# Patient Record
Sex: MALE | Race: WHITE | NOT HISPANIC OR LATINO | Employment: FULL TIME | URBAN - NONMETROPOLITAN AREA
[De-identification: names, ages, dates, MRNs, and addresses within clinical notes are randomized per-mention and may not be internally consistent; named-entity substitution may affect disease eponyms.]

---

## 2018-01-09 ENCOUNTER — OFFICE VISIT (OUTPATIENT)
Dept: FAMILY MEDICINE | Facility: OTHER | Age: 38
End: 2018-01-09
Payer: COMMERCIAL

## 2018-01-09 VITALS
TEMPERATURE: 96.8 F | HEART RATE: 76 BPM | WEIGHT: 207.4 LBS | BODY MASS INDEX: 29.03 KG/M2 | RESPIRATION RATE: 16 BRPM | SYSTOLIC BLOOD PRESSURE: 114 MMHG | HEIGHT: 71 IN | DIASTOLIC BLOOD PRESSURE: 70 MMHG

## 2018-01-09 DIAGNOSIS — R20.2 PARESTHESIA: ICD-10-CM

## 2018-01-09 DIAGNOSIS — M79.642 PAIN IN BOTH HANDS: Primary | ICD-10-CM

## 2018-01-09 DIAGNOSIS — R25.2 MUSCLE CRAMPS: ICD-10-CM

## 2018-01-09 DIAGNOSIS — M79.641 PAIN IN BOTH HANDS: Primary | ICD-10-CM

## 2018-01-09 LAB
ANION GAP SERPL CALCULATED.3IONS-SCNC: 5 MMOL/L (ref 3–14)
BUN SERPL-MCNC: 10 MG/DL (ref 7–30)
CALCIUM SERPL-MCNC: 9.3 MG/DL (ref 8.5–10.1)
CHLORIDE SERPL-SCNC: 105 MMOL/L (ref 94–109)
CO2 SERPL-SCNC: 28 MMOL/L (ref 20–32)
CREAT SERPL-MCNC: 0.9 MG/DL (ref 0.66–1.25)
CRP SERPL-MCNC: <2.9 MG/L (ref 0–8)
ERYTHROCYTE [DISTWIDTH] IN BLOOD BY AUTOMATED COUNT: 12 % (ref 10–15)
GFR SERPL CREATININE-BSD FRML MDRD: >90 ML/MIN/1.7M2
GLUCOSE SERPL-MCNC: 100 MG/DL (ref 70–99)
HCT VFR BLD AUTO: 45.9 % (ref 40–53)
HGB BLD-MCNC: 15.9 G/DL (ref 13.3–17.7)
MCH RBC QN AUTO: 33.1 PG (ref 26.5–33)
MCHC RBC AUTO-ENTMCNC: 34.6 G/DL (ref 31.5–36.5)
MCV RBC AUTO: 95 FL (ref 78–100)
PLATELET # BLD AUTO: 309 10E9/L (ref 150–450)
POTASSIUM SERPL-SCNC: 4.2 MMOL/L (ref 3.4–5.3)
RBC # BLD AUTO: 4.81 10E12/L (ref 4.4–5.9)
SODIUM SERPL-SCNC: 138 MMOL/L (ref 133–144)
VIT B12 SERPL-MCNC: 720 PG/ML (ref 193–986)
WBC # BLD AUTO: 9 10E9/L (ref 4–11)

## 2018-01-09 PROCEDURE — 82607 VITAMIN B-12: CPT | Performed by: PHYSICIAN ASSISTANT

## 2018-01-09 PROCEDURE — 86140 C-REACTIVE PROTEIN: CPT | Performed by: PHYSICIAN ASSISTANT

## 2018-01-09 PROCEDURE — 36415 COLL VENOUS BLD VENIPUNCTURE: CPT | Performed by: PHYSICIAN ASSISTANT

## 2018-01-09 PROCEDURE — 85027 COMPLETE CBC AUTOMATED: CPT | Performed by: PHYSICIAN ASSISTANT

## 2018-01-09 PROCEDURE — 99204 OFFICE O/P NEW MOD 45 MIN: CPT | Performed by: PHYSICIAN ASSISTANT

## 2018-01-09 PROCEDURE — 80048 BASIC METABOLIC PNL TOTAL CA: CPT | Performed by: PHYSICIAN ASSISTANT

## 2018-01-09 RX ORDER — NAPROXEN 500 MG/1
500 TABLET ORAL 2 TIMES DAILY PRN
Qty: 30 TABLET | Refills: 1 | Status: SHIPPED | OUTPATIENT
Start: 2018-01-09

## 2018-01-09 ASSESSMENT — PAIN SCALES - GENERAL: PAINLEVEL: NO PAIN (0)

## 2018-01-09 NOTE — MR AVS SNAPSHOT
After Visit Summary   1/9/2018    Dago Escalante    MRN: [id number]           Patient Information     Date Of Birth          1980        Visit Information        Provider Department      1/9/2018 9:40 AM Mariana Escoto PA-C [facility]        Today's Diagnoses     Pain in both hands    -  1    Paresthesia        Muscle cramps          Care Instructions    I will get some lab work to evaluate your symptoms and will order some testing to further evaluate. I will also refer you to orthopedics for further evaluation and treatment of wrist and hand pain.     Having EMG and NCS Tests  You will be having electromyography (EMG) and nerve conduction studies (NCS) to measure muscle and nerve function. In most cases, both tests are done. NCS is most often done first. You will be asked to lie on an exam table with a blanket over you. You may have one or both of the following:    Nerve conduction study (NCS)  During NCS, mild electrical currents are used to test how fast impulses move along your nerves. The healthcare provider will put small metal disks (electrodes) on your skin on the area of your body being tested. This will be done using water-based gel or paste. A doctor or technologist will apply mild electrical currents to your skin. Your muscles will twitch, but the test won t harm you. Currents are usually applied to the same area several times. Usually the intensity of the electrical stimulation is increased on each body part. Despite some increasing discomfort that varies from person to person, the electrical shock is not dangerous. The test may continue on other parts of your body unless the reason for doing the test is limited to a small part of the body.  Electromyography (EMG)  Most of the electrodes will be removed for EMG. The doctor will clean the area being tested with alcohol. A very fine needle will be put into the muscles in this region. When the needle is inserted, you  may feel as if your skin is being pinched. Try to relax and do as instructed, since you will be asked to relax and contract the muscle being tested. Following instructions will allow your doctor to interpret the test results.  Let the technologist know  For your safety and for the success of your test, tell the technologist if you:    Have any bleeding problems.    Take blood thinners (anticoagulants) or other medications, including aspirin.    Have any immune system problems.    Have had neck or back surgery.  You may also be asked questions about your overall health.  Before the test  Prepare for your test as instructed. Shower or bathe, but don't use powder, oil, or lotion. Your skin should be clean and free of excess oil. Wear loose clothes. But know that you may be asked to change into a hospital gown. The entire test will take about 60 minutes. Be sure to allow extra time to check in.  After your test  Before you leave, all electrodes will be removed. You can then get right back to your normal routine. If you feel tired or have some discomfort, take it easy. If you were told to stop taking any medicines for your test, ask when you can start taking them again. Your doctor will let you know when your test results are ready.  Date Last Reviewed: 9/12/2015 2000-2017 [employer]. [address] [place] [address], [place] [id number]. All rights reserved. This information is not intended as a substitute for professional medical care. Always follow your healthcare professional's instructions.                Follow-ups after your visit        Additional Services     ORTHO  REFERRAL       [facility] is referring you to the Orthopedic  Services at [facility].       The  Representative will assist you in the coordination of your Orthopedic and Musculoskeletal Care as prescribed by your physician.    The  Representative will call you within 1  "business day to help schedule your appointment, or you may contact the  Representative at:    All areas ~ [phone]     Type of Referral : Surgical / Specialist       Timeframe requested: Routine    Coverage of these services is subject to the terms and limitations of your health insurance plan.  Please call member services at your health plan with any benefit or coverage questions.      If X-rays, CT or MRI's have been performed, please contact the facility where they were done to arrange for , prior to your scheduled appointment.  Please bring this referral request to your appointment and present it to your specialist.                  Follow-up notes from your care team     Return if symptoms worsen or fail to improve.      Who to contact     If you have questions or need follow up information about today's clinic visit or your schedule please contact [facility] directly at [phone].  Normal or non-critical lab and imaging results will be communicated to you by dateIITianshart, letter or phone within 4 business days after the clinic has received the results. If you do not hear from us within 7 days, please contact the clinic through MyChart or phone. If you have a critical or abnormal lab result, we will notify you by phone as soon as possible.  Submit refill requests through [employer] or call your pharmacy and they will forward the refill request to us. Please allow 3 business days for your refill to be completed.          Additional Information About Your Visit        [employer] Information     [employer] lets you send messages to your doctor, view your test results, renew your prescriptions, schedule appointments and more. To sign up, go to [email or web address] . Click on \"Log in\" on the left side of the screen, which will take you to the Welcome page. Then click on \"Sign up Now\" on the right side of the page.     You will be asked to enter the access code listed below, as well as " "some personal information. Please follow the directions to create your username and password.     Your access code is: [email or web address]  Expires: 2018 10:05 AM     Your access code will  in 90 days. If you need help or a new code, please call your [place] clinic or [phone].        Care EveryWhere ID     This is your Care EveryWhere ID. This could be used by other organizations to access your [place] medical records  [id number]        Your Vitals Were     Pulse Temperature Respirations Height BMI (Body Mass Index)       76 96.8  F (36  C) (Oral) 16 5' 11.25\" (1.81 m) 28.72 kg/m2        Blood Pressure from Last 3 Encounters:   18 114/70   13 110/76   09 132/76    Weight from Last 3 Encounters:   18 207 lb 6.4 oz (94.1 kg)   13 230 lb 8 oz (104.6 kg)   09 200 lb (90.7 kg)              We Performed the Following     Basic metabolic panel     CBC with platelets     CRP, inflammation     NEEDLE EMG 2 EXTREMITIES     ORTHO  REFERRAL     Vitamin B12          Today's Medication Changes          These changes are accurate as of: 18 10:05 AM.  If you have any questions, ask your nurse or doctor.               Start taking these medicines.        Dose/Directions    naproxen 500 MG tablet   Commonly known as:  NAPROSYN   Used for:  Pain in both hands   Started by:  Mariana Escoto PA-C        Dose:  500 mg   Take 1 tablet (500 mg) by mouth 2 times daily as needed for moderate pain   Quantity:  30 tablet   Refills:  1            Where to get your medicines      These medications were sent to [facility] [place], [place] - [address]   [address] [facility] [address]     Phone:  [phone]     naproxen 500 MG tablet                Primary Care Provider Fax #    Physician No Ref-Primary [phone]       No address on file        Equal Access to Services     CHAZ ASTUDILLO AH: Zoila Siegel, wabeatrizda fe, qaybta gabriel chahal, " hector limelmo wen'aan ah. So [facility] [phone].    ATENCIÓN: Si habla sriram, tiene a melendez disposición servicios gratuitos de asistencia lingüística. Carmen al [phone].    We comply with applicable federal civil rights laws and [place] laws. We do not discriminate on the basis of race, color, national origin, age, disability, sex, sexual orientation, or gender identity.            Thank you!     Thank you for choosing [facility]  for your care. Our goal is always to provide you with excellent care. Hearing back from our patients is one way we can continue to improve our services. Please take a few minutes to complete the written survey that you may receive in the mail after your visit with us. Thank you!             Your Updated Medication List - Protect others around you: Learn how to safely use, store and throw away your medicines at www.disposemymeds.org.          This list is accurate as of: 1/9/18 10:05 AM.  Always use your most recent med list.                   Brand Name Dispense Instructions for use Diagnosis    naproxen 500 MG tablet    NAPROSYN    30 tablet    Take 1 tablet (500 mg) by mouth 2 times daily as needed for moderate pain    Pain in both hands

## 2018-01-09 NOTE — NURSING NOTE
"Chief Complaint   Patient presents with     Pain     bilateral hand and arm pain, x years       Initial /70 (BP Location: Right arm, Patient Position: Chair, Cuff Size: Adult Large)  Pulse 76  Temp 96.8  F (36  C) (Oral)  Resp 16  Ht 5' 11.25\" (1.81 m)  Wt 207 lb 6.4 oz (94.1 kg)  BMI 28.72 kg/m2 Estimated body mass index is 28.72 kg/(m^2) as calculated from the following:    Height as of this encounter: 5' 11.25\" (1.81 m).    Weight as of this encounter: 207 lb 6.4 oz (94.1 kg).  Medication Reconciliation: complete       Kerri HAIDER LPN    "

## 2018-01-09 NOTE — PATIENT INSTRUCTIONS
I will get some lab work to evaluate your symptoms and will order some testing to further evaluate. I will also refer you to orthopedics for further evaluation and treatment of wrist and hand pain.     Having EMG and NCS Tests  You will be having electromyography (EMG) and nerve conduction studies (NCS) to measure muscle and nerve function. In most cases, both tests are done. NCS is most often done first. You will be asked to lie on an exam table with a blanket over you. You may have one or both of the following:    Nerve conduction study (NCS)  During NCS, mild electrical currents are used to test how fast impulses move along your nerves. The healthcare provider will put small metal disks (electrodes) on your skin on the area of your body being tested. This will be done using water-based gel or paste. A doctor or technologist will apply mild electrical currents to your skin. Your muscles will twitch, but the test won t harm you. Currents are usually applied to the same area several times. Usually the intensity of the electrical stimulation is increased on each body part. Despite some increasing discomfort that varies from person to person, the electrical shock is not dangerous. The test may continue on other parts of your body unless the reason for doing the test is limited to a small part of the body.  Electromyography (EMG)  Most of the electrodes will be removed for EMG. The doctor will clean the area being tested with alcohol. A very fine needle will be put into the muscles in this region. When the needle is inserted, you may feel as if your skin is being pinched. Try to relax and do as instructed, since you will be asked to relax and contract the muscle being tested. Following instructions will allow your doctor to interpret the test results.  Let the technologist know  For your safety and for the success of your test, tell the technologist if you:    Have any bleeding problems.    Take blood thinners  (anticoagulants) or other medications, including aspirin.    Have any immune system problems.    Have had neck or back surgery.  You may also be asked questions about your overall health.  Before the test  Prepare for your test as instructed. Shower or bathe, but don't use powder, oil, or lotion. Your skin should be clean and free of excess oil. Wear loose clothes. But know that you may be asked to change into a hospital gown. The entire test will take about 60 minutes. Be sure to allow extra time to check in.  After your test  Before you leave, all electrodes will be removed. You can then get right back to your normal routine. If you feel tired or have some discomfort, take it easy. If you were told to stop taking any medicines for your test, ask when you can start taking them again. Your doctor will let you know when your test results are ready.  Date Last Reviewed: 9/12/2015 2000-2017 [employer]. [address] [place] [address], [place] [address]. All rights reserved. This information is not intended as a substitute for professional medical care. Always follow your healthcare professional's instructions.

## 2018-01-09 NOTE — PROGRESS NOTES
SUBJECTIVE:   Dago Escalante is a 37 year old male who presents to clinic today for the following health issues:      Concern - bilateral hand and arm pain  Onset: years    Description:   Pain and tingling in hands; and bilateral forearm pain    Intensity: moderate, severe    Progression of Symptoms:  same    Accompanying Signs & Symptoms:  nothing    Previous history of similar problem:   yes    Precipitating factors:   Worsened by: use of the hands and arms    Alleviating factors:  Improved by: nothing    Therapies Tried and outcome: nothing    Patient reports that he has had issues with pain in his bilateral hands and wrists with use since he was a teenager. He reports that with any repetitive motion or use he loses  strength and develops some numbness and pain to the hands, symptoms are worse on the right than the left. He has not previously been evaluated for this but does report that he tried using braces for a 2 week period of time and did not have any improvement in symptoms. Patient also reports that at times with use he gets severe cramps in the forearms that actually lock the arm in flexion. He has had some improvement in pain with massage and rest but has not been able to find anything tht helps resolve his symptoms. He denies any other neurologic symptoms, is unaware of any family history of autoimmune or nervous system disease. He has not had fevers, chills or weight changes.   -------------------------------------    Problem list and histories reviewed & adjusted, as indicated.  Additional history: as documented    BP Readings from Last 3 Encounters:   01/09/18 114/70   06/14/13 110/76   08/25/09 132/76    Wt Readings from Last 3 Encounters:   01/09/18 207 lb 6.4 oz (94.1 kg)   06/14/13 230 lb 8 oz (104.6 kg)   08/25/09 200 lb (90.7 kg)         Reviewed and updated as needed this visit by clinical staffTobacco  Allergies  Med Hx  Surg Hx  Fam Hx  Soc Hx      Reviewed and updated as  "needed this visit by Provider       ROS:  Constitutional, HEENT, cardiovascular, pulmonary, gi and gu systems are negative, except as otherwise noted.      OBJECTIVE:   /70 (BP Location: Right arm, Patient Position: Chair, Cuff Size: Adult Large)  Pulse 76  Temp 96.8  F (36  C) (Oral)  Resp 16  Ht 5' 11.25\" (1.81 m)  Wt 207 lb 6.4 oz (94.1 kg)  BMI 28.72 kg/m2  Body mass index is 28.72 kg/(m^2).  GENERAL: healthy, alert and no distress  NECK: no adenopathy  RESP: lungs clear to auscultation - no rales, rhonchi or wheezes  CV: regular rates and rhythm, peripheral pulses strong and no peripheral edema  MS: extremities normal- no gross deformities noted, no tenderness to palpation of the wrists or hands, tinel's and Phalen is negative, full ROM, equal  and no noted swelling  SKIN: no suspicious lesions or rashes    Diagnostic Test Results:  Labs and EMG ordered and pending     ASSESSMENT/PLAN:       ICD-10-CM    1. Pain in both hands M79.641 CRP, inflammation    M79.642 Vitamin B12     CBC with platelets     Basic metabolic panel     NEEDLE EMG 2 EXTREMITIES     ORTHO  REFERRAL     naproxen (NAPROSYN) 500 MG tablet   2. Paresthesia R20.2 CRP, inflammation     Vitamin B12     CBC with platelets     Basic metabolic panel     NEEDLE EMG 2 EXTREMITIES     ORTHO  REFERRAL   3. Muscle cramps R25.2 CRP, inflammation     Vitamin B12     CBC with platelets     Basic metabolic panel     NEEDLE EMG 2 EXTREMITIES     ORTHO  REFERRAL       I discussed symptoms and possible causes with patient. I will get lab work to further evaluate and will also have him get an EMG to further evaluate recurrent longstanding symptoms. I would have him also see orthopedics for further evaluation and treatment. He declines any trial of bracing at this time as he did not have improvement with this in the past. He will take naproxen BID for 10-14 days for inflammation.   See Patient Instructions    Mariana GONZALEZ " HAY Escoto  [facility]

## 2018-01-09 NOTE — LETTER
[facility]  [address] [facility] [address]  [phone]        January 11, 2018    Dago Escalante  [address]  [facility] [address]          Dear Dago,    The results of your recent labs showed as follows:  normal Vitamin B12 levels, normal kidney function, electrolytes, and blood cell counts normal. Follow up with EMG testing as discussed.     If you have any questions or problems, please give us a call at the clinic.    Sincerely,        Mariana Escoto PA-C/MAR to

## 2018-01-16 ENCOUNTER — TRANSFERRED RECORDS (OUTPATIENT)
Dept: HEALTH INFORMATION MANAGEMENT | Facility: CLINIC | Age: 38
End: 2018-01-16

## 2018-01-23 ENCOUNTER — TELEPHONE (OUTPATIENT)
Dept: FAMILY MEDICINE | Facility: OTHER | Age: 38
End: 2018-01-23

## 2018-01-23 NOTE — TELEPHONE ENCOUNTER
Patient called requesting the results from the EMG test that was done on 1/9/2018. I was unable to see any results in his chart for this. Please call patient on his home or cell number.    Rossy Bejarano MA     1/23/2018

## 2018-02-20 NOTE — TELEPHONE ENCOUNTER
Patient called looking for the results, please advise asap.  [phone] home number today  Thank you,  Clair Alicia  Patient Representative

## 2018-02-20 NOTE — TELEPHONE ENCOUNTER
I left a call back message:  Per Mariana Escoto PA-C  EMG normal and if symptoms persist follow up with primary provider or internal med.

## 2018-03-02 ENCOUNTER — OFFICE VISIT (OUTPATIENT)
Dept: FAMILY MEDICINE | Facility: OTHER | Age: 38
End: 2018-03-02
Payer: COMMERCIAL

## 2018-03-02 VITALS
WEIGHT: 209 LBS | HEART RATE: 80 BPM | DIASTOLIC BLOOD PRESSURE: 82 MMHG | BODY MASS INDEX: 28.95 KG/M2 | OXYGEN SATURATION: 97 % | SYSTOLIC BLOOD PRESSURE: 130 MMHG | TEMPERATURE: 96 F

## 2018-03-02 DIAGNOSIS — Z71.6 ENCOUNTER FOR TOBACCO USE CESSATION COUNSELING: ICD-10-CM

## 2018-03-02 DIAGNOSIS — M62.81 HAND MUSCLE WEAKNESS: Primary | ICD-10-CM

## 2018-03-02 DIAGNOSIS — Z13.6 CARDIOVASCULAR SCREENING; LDL GOAL LESS THAN 160: ICD-10-CM

## 2018-03-02 LAB
CHOLEST SERPL-MCNC: 232 MG/DL
HDLC SERPL-MCNC: 39 MG/DL
LDLC SERPL CALC-MCNC: 156 MG/DL
NONHDLC SERPL-MCNC: 193 MG/DL
TRIGL SERPL-MCNC: 184 MG/DL

## 2018-03-02 PROCEDURE — 83516 IMMUNOASSAY NONANTIBODY: CPT | Mod: 59 | Performed by: INTERNAL MEDICINE

## 2018-03-02 PROCEDURE — 99000 SPECIMEN HANDLING OFFICE-LAB: CPT | Performed by: INTERNAL MEDICINE

## 2018-03-02 PROCEDURE — 80061 LIPID PANEL: CPT | Performed by: INTERNAL MEDICINE

## 2018-03-02 PROCEDURE — 99214 OFFICE O/P EST MOD 30 MIN: CPT | Performed by: INTERNAL MEDICINE

## 2018-03-02 PROCEDURE — 83519 RIA NONANTIBODY: CPT | Mod: 90 | Performed by: INTERNAL MEDICINE

## 2018-03-02 PROCEDURE — 36415 COLL VENOUS BLD VENIPUNCTURE: CPT | Performed by: INTERNAL MEDICINE

## 2018-03-02 NOTE — MR AVS SNAPSHOT
"              After Visit Summary   3/2/2018    Dago Escalante    MRN: [id number]           Patient Information     Date Of Birth          1980        Visit Information        Provider Department      3/2/2018 7:00 AM Jeremiah Sears DO [facility]        Today's Diagnoses     Hand muscle weakness    -  1    CARDIOVASCULAR SCREENING; LDL GOAL LESS THAN 160        Encounter for tobacco use cessation counseling           Follow-ups after your visit        Follow-up notes from your care team     Return in about 2 weeks (around 3/16/2018).      Who to contact     If you have questions or need follow up information about today's clinic visit or your schedule please contact [facility] directly at [phone].  Normal or non-critical lab and imaging results will be communicated to you by MyChart, letter or phone within 4 business days after the clinic has received the results. If you do not hear from us within 7 days, please contact the clinic through [employer] or phone. If you have a critical or abnormal lab result, we will notify you by phone as soon as possible.  Submit refill requests through [employer] or call your pharmacy and they will forward the refill request to us. Please allow 3 business days for your refill to be completed.          Additional Information About Your Visit        MyChart Information     [employer] lets you send messages to your doctor, view your test results, renew your prescriptions, schedule appointments and more. To sign up, go to [email or web address] . Click on \"Log in\" on the left side of the screen, which will take you to the Welcome page. Then click on \"Sign up Now\" on the right side of the page.     You will be asked to enter the access code listed below, as well as some personal information. Please follow the directions to create your username and password.     Your access code is: [email or web address]  Expires: 4/9/2018 11:05 AM     Your access code " will  in 90 days. If you need help or a new code, please call your [place] clinic or [phone].        Care EveryWhere ID     This is your Care EveryWhere ID. This could be used by other organizations to access your [place] medical records  [id number]        Your Vitals Were     Pulse Temperature Pulse Oximetry BMI (Body Mass Index)          80 96  F (35.6  C) (Tympanic) 97% 28.95 kg/m2         Blood Pressure from Last 3 Encounters:   18 130/82   18 114/70   13 110/76    Weight from Last 3 Encounters:   18 209 lb (94.8 kg)   18 207 lb 6.4 oz (94.1 kg)   13 230 lb 8 oz (104.6 kg)              We Performed the Following     Acetylcholine receptor blocking rafa     Lipid panel reflex to direct LDL Fasting     MuSK Antibody        Primary Care Provider Fax #    Physician No Ref-Primary [phone]       No address on file        Equal Access to Services     CHAZ ASTUDILLO : Hadii esther ku hadasho Soomaali, waaxda luqadaha, qaybta kaalmada adeegyada, waxay kay campo . So [place] [phone].    ATENCIÓN: Si habla español, tiene a melendez disposición servicios gratuitos de asistencia lingüística. Llame al [phone].    We comply with applicable federal civil rights laws and [place] laws. We do not discriminate on the basis of race, color, national origin, age, disability, sex, sexual orientation, or gender identity.            Thank you!     Thank you for choosing [facility]  for your care. Our goal is always to provide you with excellent care. Hearing back from our patients is one way we can continue to improve our services. Please take a few minutes to complete the written survey that you may receive in the mail after your visit with us. Thank you!             Your Updated Medication List - Protect others around you: Learn how to safely use, store and throw away your medicines at www.disposemymeds.org.          This list is accurate as of  3/2/18 11:59 PM.  Always use your most recent med list.                   Brand Name Dispense Instructions for use Diagnosis    naproxen 500 MG tablet    NAPROSYN    30 tablet    Take 1 tablet (500 mg) by mouth 2 times daily as needed for moderate pain    Pain in both hands

## 2018-03-02 NOTE — PROGRESS NOTES
"  SUBJECTIVE:   Dago Escalante is a 37 year old male who presents to clinic today for the following health issues:    New Patient/Transfer of Care          Chief Complaint:  Dago Escalante is an otherwise healthy 37 year old male who presents today to establish care and concerns of a multi-year history of bilateral hand weakness and numbness. According to the patient, over the course of the past 15 years, he has been experiencing weakness and paraesthesias in his hands/distal upper extremities that seem to be exacerbated with any use of his upper extremities (especially his hands) including writing, shoveling and anything that involves  strength. 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 He states he would be doing something with his upper extremities, like shoveling, and would get a burning sensation in his forearms, he would flex his elbow and they would become \"locked\" requiring him manually extend the elbow with his other arm or have someone pull on it to get it back into extension. He also complains of difficulty with tasks such as writing or fine motor movements such as playing with Legos with his son. He states he can write for only a very short time before he develops weakness and paresthesias in his hands making it difficult to continue writing or performing fine motor movements. He was seen in clinic on 01/09 for these complaints and underwent an unremarkable EMG, as well normal laboratory evaluation including BMP, CBC, vitamin B12 and CRP. At that time, his symptoms were thought to possibly be related to carpal tunnel and he was started on Naproxen and advised to follow up with orthopedics. Today, the patient continues to complain of similar symptoms. Most recently, he reports being at a car wash using a  and after a few minutes of gripping the , he developed severe weakness to the point he was unable to put " his hand into his pocket or even  a quarter out of his other hand. He has not had any lower extremity weakness of paresthesias. He denies any shoulder/neck pain or weakness. He has not had any other focal neurological deficits including any vision changes or headaches.      The patient also would like information on quitting smoking. Currently, he smokes approximately 0.5 packs per day. He has tried to stop multiple times in the past with multiple different therapies including Nicotine patches, Nicotine gum, Chantix and hypnosis, all without any success.  He denies any other concerns or complaints at this time. He has not had any chest pain or shortness of breath. Appetite has been good with no nausea or vomiting. Bowel and bladder habits have been at baseline.     Allergies:  Allergies   Allergen Reactions     No Known Drug Allergies        Medical History:   No past medical history on file.    Current Medications:   Current Outpatient Prescriptions   Medication Sig Dispense Refill     naproxen (NAPROSYN) 500 MG tablet Take 1 tablet (500 mg) by mouth 2 times daily as needed for moderate pain 30 tablet 1       Surgical History:  Past Surgical History:   Procedure Laterality Date     TONSILLECTOMY  1993       Social/Family History:  Social History   Substance Use Topics     Smoking status: Current Every Day Smoker     Packs/day: 0.50     Years: 7.00     Types: Cigarettes     Smokeless tobacco: Never Used     Alcohol use Yes     No family history on file.    ROS:   Constitutional: The patient denies significant weight gain, weight loss.   SKIN: Pt denies: itching, rashes, discoloration, or specific lesions of concern.    EYES: Pt denies: double vision, blurred vision.    HEART: Pt denies: chest pain, arrythmia, syncope or excess edema.   LUNGS: Pt denies: cough or shortness of breath.   GI: Pt denies: nausea, vomiting, diarrhea.   MS: Pt denies: other significant joint pain, swelling, or acute loss of function  besides the distal upper extremities. Able to ambulate with little or no assistance.   NEURO: Weakness and paresthesias to bilateral upper extremities. No weakness, paraesthesias, or paralysis to the lower extremities.       Physical Exam:  /82 (BP Location: Left arm, Patient Position: Chair, Cuff Size: Adult Regular)  Pulse 80  Temp 96  F (35.6  C) (Tympanic)  Wt 209 lb (94.8 kg)  SpO2 97%  BMI 28.95 kg/m2     Constitutional: The patient appears to be in no acute distress. The patient appears to be adequately hydrated. No acute respiratory or hemodynamic distress is noted at this time.   EYES: Pupils are equal, round and reactive. No significant ptosis, conjunctivitis, or inflammation. Vision is grossly intact bilaterally.   LUNGS: clear bilaterally, airflow is brisk, no intercostal retraction or stridor is noted. No coughing is noted during visit.   HEART:  regular without rubs, clicks, gallops, or murmurs. Upstrokes are brisk. S1,S2 are heard.   GI: Abdomen is soft, without rebound, guarding or tenderness.   MS: No deformity of upper extremities is present. No acute joint erythema or swelling.   NEURO: Initially  strength in the upper bilateral hands were slightly diminished bilaterally. Patient then copied a sentence and by the 4-5th line his hand writing was deteriorating and he was having more difficult writing the sentences. Following this  strength in the right hand was noticeably decreased. Muscle strength lower extremities is equal and symmetrical bilaterally. Pt is alert and appropriate. No neurologic lateralization is noted. Cranial nerves 2-12 are grossly intact. Peripheral sensory and motor function are grossly normal.       Decision Makin. Hand muscle weakness  Given the description of the patients symptoms and the recent negative EMG and lab work, we will go ahead and test for myasthenia gravis. I discussed this in detail with the patient and he was agreeable to this. We will  contact the patient with the lab results once we get them back and discuss further treatment options at that time.   - Acetylcholine receptor blocking rafa  - MuSK Antibody    2. CARDIOVASCULAR SCREENING; LDL GOAL LESS THAN 160  Patient has not had a lipid panel recently, we will check these today.   - Lipid panel reflex to direct LDL Fasting    3. Encounter for tobacco use cessation counseling  Discussed at length with the patient different options for smoking cessation and since he has tried medication and nicotine patches in the past without any success, I encouraged him to deploy distraction techniques and explained these to him. Patient voiced his understanding of this. On his follow up appointment in the next 1-2 weeks we will discuss how this approach is working and determine other options if needed.     Diagnosis:   Encounter Diagnoses   Name Primary?     Hand muscle weakness Yes     CARDIOVASCULAR SCREENING; LDL GOAL LESS THAN 160        Follow up:  I have asked the patient to schedule a follow up appointment with me in 1-2 weeks to go over the lab results, or sooner if conditions worsen.      I have carefully explained the diagnosis and treatment options with the patient. The patient has displayed an understanding of the above, and all subsequent questions were answered.          This patient has been interviewed, examined, diagnosed, and informed of the above by me personally.  Medical records and available pertinent information has been reviewed by me personally.  All decisions and discussion have been between myself and the patient/family.  This was done in the presence of Zev marinelli , who acted as a medical scribe and recorded the events above.  No diagnosis or decision making was made by the above-mentioned scribe.  The patient, and or his/her ensurors will not be billed for the presence or actions of this scribe.  The information recorded by the scribe has been reviewed by me and  found to be accurate.            DO ELIZABETH Aceves    Portions of this note were produced using [employer]  Although every attempt at real-time proof reading has been made, occasional grammar/syntax errors may have been missed.

## 2018-03-02 NOTE — NURSING NOTE
"Chief Complaint   Patient presents with     Establish Care     Emg       Initial /82 (BP Location: Left arm, Patient Position: Chair, Cuff Size: Adult Regular)  Pulse 80  Temp 96  F (35.6  C) (Tympanic)  Wt 209 lb (94.8 kg)  SpO2 97%  BMI 28.95 kg/m2 Estimated body mass index is 28.95 kg/(m^2) as calculated from the following:    Height as of 1/9/18: 5' 11.25\" (1.81 m).    Weight as of this encounter: 209 lb (94.8 kg).  Medication Reconciliation: complete  Bernice HAMILTON    "

## 2018-03-02 NOTE — LETTER
March 15, 2018      Dgao Escalante  [address]  [place] [address]        Dear ,    We are writing to inform you of your test results.    The antibody testing for myasthenia gravis appears to be negative. I recommend follow-up with neurology.     Resulted Orders   Lipid panel reflex to direct LDL Fasting   Result Value Ref Range    Cholesterol 232 (H) <200 mg/dL      Comment:      Desirable:       <200 mg/dl    Triglycerides 184 (H) <150 mg/dL      Comment:      Borderline high:  150-199 mg/dl  High:             200-499 mg/dl  Very high:       >499 mg/dl  Fasting specimen      HDL Cholesterol 39 (L) >39 mg/dL    LDL Cholesterol Calculated 156 (H) <100 mg/dL      Comment:      Above desirable:  100-129 mg/dl  Borderline High:  130-159 mg/dL  High:             160-189 mg/dL  Very high:       >189 mg/dl      Non HDL Cholesterol 193 (H) <130 mg/dL      Comment:      Above Desirable:  130-159 mg/dl  Borderline high:  160-189 mg/dl  High:             190-219 mg/dl  Very high:       >219 mg/dl     Acetylcholine receptor blocking rafa   Result Value Ref Range    AcetChol Block Rafa 12 0 - 26 %      Comment:      (Note)  INTERPRETIVE INFORMATION: Acetylcholine Blocking Ab   Negative ............  0-26 percent blocking   Indeterminate ....... 27-41 percent blocking   Positive ............ 42 percent or greater blocking  Approximately 85-90 percent of patients with myasthenia   gravis (MG) express antibodies to the acetylcholine   receptor (AChR), which can be divided into binding,   blocking, and modulating antibodies. Binding antibody can   activate complement and lead to loss of AChR. Blocking   antibody may impair binding of acetylcholine to the   receptor, leading to poor muscle contraction. Modulating   antibody causes receptor endocytosis resulting in loss of   AChR expression, which correlates most closely with   clinical severity of disease. Approximately 10-15 percent   of individuals with confirmed  myasthenia gravis have no   measurable binding, blocking, or modulating antibodies.  Test developed and characteristics determined by [facility]. See Compliance Statement B: [employer]/  Performed by [facility],  [address] [place] [address] [phone]  [email or web address], Marlo Boggs MD, Lab. Director     MuSK Antibody   Result Value Ref Range    MuSK Antibody 0.00 0.00 - 0.02 nmol/L      Comment:      (Note)  A negative result does not exclude the diagnosis of  autoimmune myasthenia gravis. Testing for acetylcholine  receptor binding and modulating antibodies, and striational  antibody should be considered.  -------------------ADDITIONAL INFORMATION-------------------  This test was developed using an analyte specific reagent.  Its performance characteristics were determined by [facility] in a manner consistent with CLIA requirements. This  test has not been cleared or approved by the U.S. Food and  Drug Administration.  Performed at: [facility], [address] [place] [address]         If you have any questions or concerns, please call the clinic at the number listed above.       Sincerely,        Jeremiah Sears, DO

## 2018-03-05 LAB — ACHR BLOCK AB/ACHR TOTAL SFR SER: 12 % (ref 0–26)

## 2018-03-08 LAB — MUSK AB SER-SCNC: 0 NMOL/L (ref 0–0.02)

## 2018-03-13 NOTE — PROGRESS NOTES
Please contact the patient and notify him of the following:    The antibody testing for myasthenia gravis appears to be negative.  Would recommend follow-up with neurology.      Thank you.   DO ELIZABETH Aceves

## 2018-03-22 ENCOUNTER — TELEPHONE (OUTPATIENT)
Dept: FAMILY MEDICINE | Facility: OTHER | Age: 38
End: 2018-03-22

## 2018-03-22 DIAGNOSIS — R29.898 HAND WEAKNESS: Primary | ICD-10-CM

## 2018-03-22 NOTE — TELEPHONE ENCOUNTER
Reason for Call:  Other call back    Detailed comments: Pt wants a call back from Renu explaining his results to him. Please call and advise.     Phone Number Patient can be reached at: Home number on file [phone] (home) or [phone]    Best Time: anytime    Can we leave a detailed message on this number? YES    Call taken on 3/22/2018 at 7:18 AM by Cleopatra Gayle

## 2018-03-22 NOTE — TELEPHONE ENCOUNTER
I have attempted to contact this patient by phone with the following results: left message to return my call on answering machine.  Rossy Bejarano MA     3/22/2018

## 2018-03-23 NOTE — TELEPHONE ENCOUNTER
Patient is willing to see neurology, also encouraged to make an appointment for a yearly physical.     Referral for neurology placed will need Dx and Signature and patient will be contacted by scheduling.    America Rey XRO/  [facility]

## 2018-04-06 ENCOUNTER — TRANSFERRED RECORDS (OUTPATIENT)
Dept: HEALTH INFORMATION MANAGEMENT | Facility: CLINIC | Age: 38
End: 2018-04-06

## 2018-07-16 ENCOUNTER — TRANSFERRED RECORDS (OUTPATIENT)
Dept: HEALTH INFORMATION MANAGEMENT | Facility: CLINIC | Age: 38
End: 2018-07-16

## 2023-05-11 ENCOUNTER — TELEPHONE (OUTPATIENT)
Dept: FAMILY MEDICINE | Facility: CLINIC | Age: 43
End: 2023-05-11

## 2023-05-11 ENCOUNTER — OFFICE VISIT (OUTPATIENT)
Dept: FAMILY MEDICINE | Facility: CLINIC | Age: 43
End: 2023-05-11
Payer: COMMERCIAL

## 2023-05-11 VITALS
WEIGHT: 187.8 LBS | RESPIRATION RATE: 16 BRPM | OXYGEN SATURATION: 99 % | DIASTOLIC BLOOD PRESSURE: 78 MMHG | BODY MASS INDEX: 26.29 KG/M2 | TEMPERATURE: 97.3 F | HEIGHT: 71 IN | SYSTOLIC BLOOD PRESSURE: 120 MMHG | HEART RATE: 72 BPM

## 2023-05-11 DIAGNOSIS — Z72.0 TOBACCO ABUSE: ICD-10-CM

## 2023-05-11 DIAGNOSIS — G24.9 DYSTONIA: ICD-10-CM

## 2023-05-11 DIAGNOSIS — Z76.89 ENCOUNTER TO ESTABLISH CARE: Primary | ICD-10-CM

## 2023-05-11 DIAGNOSIS — K59.00 CONSTIPATION, UNSPECIFIED CONSTIPATION TYPE: ICD-10-CM

## 2023-05-11 DIAGNOSIS — H61.23 IMPACTED CERUMEN OF BOTH EARS: ICD-10-CM

## 2023-05-11 DIAGNOSIS — H93.13 TINNITUS, BILATERAL: ICD-10-CM

## 2023-05-11 DIAGNOSIS — F17.200 NICOTINE DEPENDENCE, UNCOMPLICATED, UNSPECIFIED NICOTINE PRODUCT TYPE: ICD-10-CM

## 2023-05-11 PROCEDURE — 99204 OFFICE O/P NEW MOD 45 MIN: CPT | Performed by: STUDENT IN AN ORGANIZED HEALTH CARE EDUCATION/TRAINING PROGRAM

## 2023-05-11 PROCEDURE — 99406 BEHAV CHNG SMOKING 3-10 MIN: CPT | Performed by: STUDENT IN AN ORGANIZED HEALTH CARE EDUCATION/TRAINING PROGRAM

## 2023-05-11 RX ORDER — BUPROPION HYDROCHLORIDE 150 MG/1
TABLET, FILM COATED, EXTENDED RELEASE ORAL
Qty: 57 TABLET | Refills: 0 | Status: SHIPPED | OUTPATIENT
Start: 2023-05-11 | End: 2023-06-10

## 2023-05-11 RX ORDER — BUPROPION HYDROCHLORIDE 150 MG/1
150 TABLET, FILM COATED, EXTENDED RELEASE ORAL 2 TIMES DAILY
Qty: 60 TABLET | Refills: 2 | Status: SHIPPED | OUTPATIENT
Start: 2023-06-10

## 2023-05-11 ASSESSMENT — PATIENT HEALTH QUESTIONNAIRE - PHQ9
SUM OF ALL RESPONSES TO PHQ QUESTIONS 1-9: 3
10. IF YOU CHECKED OFF ANY PROBLEMS, HOW DIFFICULT HAVE THESE PROBLEMS MADE IT FOR YOU TO DO YOUR WORK, TAKE CARE OF THINGS AT HOME, OR GET ALONG WITH OTHER PEOPLE: NOT DIFFICULT AT ALL
SUM OF ALL RESPONSES TO PHQ QUESTIONS 1-9: 3

## 2023-05-11 ASSESSMENT — PAIN SCALES - GENERAL: PAINLEVEL: NO PAIN (0)

## 2023-05-11 NOTE — PATIENT INSTRUCTIONS
Zyban (burpropion) Oral Tablet    Uses  This medicine is used for the following purposes:      depression    stop smoking    Instructions  Swallow the medicine without crushing or chewing it.    This medicine may be taken with or without food.    Try to avoid taking the medicine at bedtime.    Keep the medicine at room temperature. Avoid heat and direct light.    Choose a date to quit smoking. Start this medicine 1 week before your chosen day to quit smoking.    If you forget to take a dose on time, take it as soon as you remember. If it is almost time for the next dose, do not take the missed dose. Return to your normal dosing schedule. Do not take 2 doses of this medicine at one time.    Please tell your doctor and pharmacist about all the medicines you take. Include both prescription and over-the-counter medicines. Also tell them about any vitamins, herbal medicines, or anything else you take for your health.    Do not suddenly stop taking this medicine. Check with your doctor before stopping.    Cautions  Tell your doctor and pharmacist if you ever had an allergic reaction to a medicine. Symptoms of an allergic reaction can include trouble breathing, skin rash, itching, swelling, or severe dizziness.    This medicine is associated with an increased risk for seizures. Please ask your doctor whether you may be at risk for having a seizure while on this medicine.    Do not use the medication any more than instructed.    Your ability to stay alert or to react quickly may be impaired by this medicine. Do not drive or operate machinery until you know how this medicine will affect you.    Please check with your doctor before drinking alcohol while on this medicine.    Family should check on the patient often. Call the doctor if patient becomes more depressed, has thoughts of suicide, or shows changes in behavior.    Call the doctor if there are any signs of confusion or unusual changes in behavior.    Tell the  doctor or pharmacist if you are pregnant, planning to be pregnant, or breastfeeding.    Ask your pharmacist if this medicine can interact with any of your other medicines. Be sure to tell them about all the medicines you take.    Do not start or stop any other medicines without first speaking to your doctor or pharmacist.    Do not share this medicine with anyone who has not been prescribed this medicine.    This medicine is associated with increased risk of death in certain patients. Please speak with your doctor about the benefits and risks of using this medicine.    This medicine can cause serious side effects in some patients. Important information from the U.S. Food and Drug Administration (FDA) is available from your pharmacist. Please review it carefully with your pharmacist to understand the risks associated with this medicine.    Side Effects  The following is a list of some common side effects from this medicine. Please speak with your doctor about what you should do if you experience these or other side effects.      agitated feeling or trouble sleeping    decreased appetite    increased appetite    constipation    dizziness    drowsiness or sedation    dry mouth    headaches    high blood pressure    itching    muscle pain    nausea    skin irritation such as redness, itching, rash, or burning    problems with sexual functions or desire    sore throat    stomach upset or abdominal pain    sweating    vomiting    weight loss    If you have any of the following side effects, you may be getting too much medicine. Please contact your doctor to let them know about these side effects.      change in behavior    confusion    diarrhea    fainting    hallucinations (unusual thoughts, seeing or hearing things that are not real)    rapid heartbeat    pain in the joints    tight or rigid muscles    muscle trembling    Call your doctor or get medical help right away if you notice any of these more serious side  effects:      chest pain    fast or irregular heart beats    dilation of the pupils    seizures    shortness of breath    A few people may have an allergic reaction to this medicine. Symptoms can include difficulty breathing, skin rash, itching, swelling, or severe dizziness. If you notice any of these symptoms, seek medical help quickly.    Extra  Please speak with your doctor, nurse, or pharmacist if you have any questions about this medicine.      [email or web address]    IMPORTANT NOTE: This document tells you briefly how to take your medicine, but it does not tell you all there is to know about it. Your doctor or pharmacist may give you other documents about your medicine. Please talk to them if you have any questions. Always follow their advice. There is a more complete description of this medicine available in English. Scan this code on your smartphone or tablet or use the web address below. You can also ask your pharmacist for a printout. If you have any questions, please ask your pharmacist.   2021 [employer]      7045-1048 [employer]. All rights reserved. This information is not intended as a substitute for professional medical care. Always follow your healthcare professional's instructions.

## 2023-05-11 NOTE — PROGRESS NOTES
"  Assessment & Plan     Encounter to establish care  Discussed obtain labs with lipids and glucose screening today but not wanting to at this time.    Tinnitus, bilateral  Chronic in the setting of significant noise exposure.  Suspect some hearing loss.  Normal TMs today.  Discussed follow-up with audiology but he would like to hold off for now.    Tobacco abuse  Nicotine dependence, uncomplicated, unspecified nicotine product type  - [place] Quit Partner Referral  - buPROPion (ZYBAN) 150 MG 12 hr tablet  Dispense: 57 tablet; Refill: 0  - buPROPion (ZYBAN) 150 MG 12 hr tablet  Dispense: 60 tablet; Refill: 2  - nicotine (NICOTROL) 10 MG inhaler  Dispense: 6 each; Refill: 2  - SMOKING CESSATION COUNSELING 3-10 MIN    Constipation, unspecified constipation type  Encourage increase fluid and fiber intake.  Can use MiraLAX as needed    Dystonia  Symptoms unchanged and is managed well in his day-to-day.  Significant upper extremity exertion does cause what sounds like dystonia.  Previous evaluation done and did not tolerate medication by neurology including carbamazepine.  Did not find PT significantly helpful.  He has no other focal deficits or radicular symptoms that would be concerning for cervical compression.  I did recommend follow-up with neuromuscular specialist which she is willing to do now.  - Adult Neurology  Referral    Impacted cerumen of both ears  - [place] REMOVAL IMPACTED CERUMEN IRRIGATION/LVG ABIOLA (RN/MA)       Nicotine/Tobacco Cessation:  He reports that he has been smoking cigarettes. He has a 23.00 pack-year smoking history. He has never used smokeless tobacco.  Nicotine/Tobacco Cessation Plan:   Pharmacotherapies : bupropion (Zyban) and Nicotine inhaler      BMI:   Estimated body mass index is 26.01 kg/m  as calculated from the following:    Height as of this encounter: 1.81 m (5' 11.25\").    Weight as of this encounter: 85.2 kg (187 lb 12.8 oz).   Weight management plan: Discussed healthy diet " "and exercise guidelines    Lenny Dumont MD  [facility] CHINTAN Loza is a 42 year old, presenting for the following health issues:  Establish Care and Ear Problem (Ringing in bilateral ears/)        5/11/2023     8:08 AM   Additional Questions   Roomed by Kerri HAIDER LPN         5/11/2023     8:08 AM   Patient Reported Additional Medications   Patient reports taking the following new medications stool softner     History of Present Illness       Reason for visit:  Ear ringing  Symptom onset:  More than a month  Symptom intensity:  Severe  Symptom progression:  Staying the same  Had these symptoms before:  Yes  Has tried/received treatment for these symptoms:  No    He eats 2-3 servings of fruits and vegetables daily.He consumes 1 sweetened beverage(s) daily.He exercises with enough effort to increase his heart rate 10 to 19 minutes per day.  He exercises with enough effort to increase his heart rate 4 days per week.   He is taking medications regularly.    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 Shoots a lot of guns and around a lot of drag racing also with significant exposure. Does ride motorcycle as well.       Review of Systems   Constitutional, HEENT, cardiovascular, pulmonary, gi and gu systems are negative, except as otherwise noted.      Objective    /78 (BP Location: Right arm, Patient Position: Chair)   Pulse 72   Temp 97.3  F (36.3  C) (Temporal)   Resp 16   Ht 1.81 m (5' 11.25\")   Wt 85.2 kg (187 lb 12.8 oz)   SpO2 99%   BMI 26.01 kg/m    Body mass index is 26.01 kg/m .  Physical Exam   GENERAL: healthy, alert and no distress  EYES: Eyes grossly normal to " inspection, PERRL and conjunctivae and sclerae normal  HENT: ear canals obstructed initially but normal after cleaning and TM's normal, nose and mouth without ulcers or lesions  NECK: no adenopathy, no asymmetry, masses, or scars and thyroid normal to palpation  RESP: lungs clear to auscultation - no rales, rhonchi or wheezes  CV: regular rate and rhythm, normal S1 S2, no S3 or S4, no murmur, click or rub, no peripheral edema and peripheral pulses strong  ABDOMEN: soft, nontender, no hepatosplenomegaly, no masses and bowel sounds normal  MS: no gross musculoskeletal defects noted, no edema  SKIN: no suspicious lesions or rashes  NEURO: Normal strength and tone, normal upper extremity strength and symmetric, mentation intact and speech normal  PSYCH: mentation appears normal, affect normal/bright

## 2023-05-11 NOTE — TELEPHONE ENCOUNTER
Prior Authorization Retail Medication Request    Medication/Dose: buPROPion (ZYBAN) 150 MG 12 hr tablet  ICD code (if different than what is on RX):  Nicotine dependence, uncomplicated, unspecified nicotine product type [F17.200]   Previously Tried and Failed:  na  Rationale:  na    Insurance Name:  [facility]  Insurance ID:  [id number]      Pharmacy Information (if different than what is on RX)  Name:  [facility]  Phone:  [phone]

## 2023-05-11 NOTE — NURSING NOTE
Patient identified using two patient identifiers.  Ear exam showing wax occlusion completed by provider.  Solution: warm water was placed in the bilateral ear(s) via irrigation tool: elephant ear.      Leatha Fink CMA

## 2023-05-17 NOTE — TELEPHONE ENCOUNTER
PA Initiation    Medication: BUPROPION HCL ER (SMOKING DET) 150 MG PO TB12  Insurance Company: [employer] ([facility]) - Phone [phone] Fax [phone]  Pharmacy Filling the Rx: [id number] - [place] - [address] [place] [address]  Filling Pharmacy Phone: [phone]  Filling Pharmacy Fax: [phone]  Start Date: 5/17/2023

## 2023-05-18 NOTE — TELEPHONE ENCOUNTER
Prior Authorization Not Needed per Insurance    Medication: BUPROPION HCL ER (SMOKING DET) 150 MG PO TB12  Insurance Company: [employer] ([facility]) - Phone [phone] Fax [phone]  Expected CoPay:      Pharmacy Filling the Rx: [id number] - [place], [place] - [address] [place] [address]  Pharmacy Notified: No  Patient Notified: No    Patient needs to call BC Quit  Program at [phone]. Clinic staff please notify patient.

## 2023-05-21 ENCOUNTER — HEALTH MAINTENANCE LETTER (OUTPATIENT)
Age: 43
End: 2023-05-21

## 2023-06-12 ENCOUNTER — TELEPHONE (OUTPATIENT)
Dept: FAMILY MEDICINE | Facility: CLINIC | Age: 43
End: 2023-06-12
Payer: COMMERCIAL

## 2023-06-12 NOTE — TELEPHONE ENCOUNTER
Prior Authorization Retail Medication Request    Medication/Dose: nicotine (NICOTROL) 10 MG inhaler  ICD code (if different than what is on RX):  Nicotine dependence, uncomplicated, unspecified nicotine product type [F17.200]   Previously Tried and Failed:  na  Rationale:  na    Insurance Name:  [facility]  Insurance ID:  [id number]      Pharmacy Information (if different than what is on RX)  Name:  [facility]  Phone:  [phone]

## 2023-06-16 NOTE — TELEPHONE ENCOUNTER
PRIOR AUTHORIZATION DENIED    Medication: NICOTROL 10 MG IN INHA  Insurance Company: [employer] ([facility]) - Phone [phone] Fax [phone]  Denial Date: 6/16/2023  Denial Rational: Patient needs to call BC Quit  Program [phone]        Appeal Information: N/A  Patient Notified: No

## 2023-06-16 NOTE — TELEPHONE ENCOUNTER
Beny Ware 9 minutes ago (10:40 AM)     BK  Hello,   This medication has been denied.   Please see documentation for denial rationale.   Please notify patient and you can close the encounter.     Thank you,   Linn Guevara Specialist

## 2023-06-20 NOTE — TELEPHONE ENCOUNTER
Left message, gave patient number for Quit  Program - needs to enroll in this.    America Rey XRO/

## 2024-05-25 ENCOUNTER — HEALTH MAINTENANCE LETTER (OUTPATIENT)
Age: 44
End: 2024-05-25

## 2024-12-18 ENCOUNTER — OFFICE VISIT (OUTPATIENT)
Dept: FAMILY MEDICINE | Facility: CLINIC | Age: 44
End: 2024-12-18
Payer: COMMERCIAL

## 2024-12-18 VITALS
SYSTOLIC BLOOD PRESSURE: 118 MMHG | HEART RATE: 75 BPM | HEIGHT: 72 IN | WEIGHT: 183.8 LBS | TEMPERATURE: 98.3 F | RESPIRATION RATE: 18 BRPM | OXYGEN SATURATION: 99 % | BODY MASS INDEX: 24.89 KG/M2 | DIASTOLIC BLOOD PRESSURE: 66 MMHG

## 2024-12-18 DIAGNOSIS — M54.2 NECK PAIN: ICD-10-CM

## 2024-12-18 DIAGNOSIS — Z00.00 ROUTINE GENERAL MEDICAL EXAMINATION AT A HEALTH CARE FACILITY: Primary | ICD-10-CM

## 2024-12-18 DIAGNOSIS — F17.200 NICOTINE DEPENDENCE, UNCOMPLICATED, UNSPECIFIED NICOTINE PRODUCT TYPE: ICD-10-CM

## 2024-12-18 DIAGNOSIS — M72.2 PLANTAR FASCIITIS: ICD-10-CM

## 2024-12-18 DIAGNOSIS — E78.2 MIXED HYPERLIPIDEMIA: ICD-10-CM

## 2024-12-18 DIAGNOSIS — H93.13 TINNITUS, BILATERAL: ICD-10-CM

## 2024-12-18 DIAGNOSIS — K59.00 CONSTIPATION, UNSPECIFIED CONSTIPATION TYPE: ICD-10-CM

## 2024-12-18 DIAGNOSIS — G24.9 DYSTONIA: ICD-10-CM

## 2024-12-18 LAB
ALBUMIN SERPL BCG-MCNC: 4.7 G/DL (ref 3.5–5.2)
ALP SERPL-CCNC: 73 U/L (ref 40–150)
ALT SERPL W P-5'-P-CCNC: 21 U/L (ref 0–70)
ANION GAP SERPL CALCULATED.3IONS-SCNC: 11 MMOL/L (ref 7–15)
AST SERPL W P-5'-P-CCNC: 18 U/L (ref 0–45)
BILIRUB SERPL-MCNC: 0.5 MG/DL
BUN SERPL-MCNC: 12 MG/DL (ref 6–20)
CALCIUM SERPL-MCNC: 9.7 MG/DL (ref 8.8–10.4)
CHLORIDE SERPL-SCNC: 102 MMOL/L (ref 98–107)
CHOLEST SERPL-MCNC: 233 MG/DL
CREAT SERPL-MCNC: 0.97 MG/DL (ref 0.67–1.17)
EGFRCR SERPLBLD CKD-EPI 2021: >90 ML/MIN/1.73M2
FASTING STATUS PATIENT QL REPORTED: NO
FASTING STATUS PATIENT QL REPORTED: NO
GLUCOSE SERPL-MCNC: 101 MG/DL (ref 70–99)
HCO3 SERPL-SCNC: 25 MMOL/L (ref 22–29)
HDLC SERPL-MCNC: 47 MG/DL
LDLC SERPL CALC-MCNC: 172 MG/DL
NONHDLC SERPL-MCNC: 186 MG/DL
POTASSIUM SERPL-SCNC: 4.3 MMOL/L (ref 3.4–5.3)
PROT SERPL-MCNC: 7 G/DL (ref 6.4–8.3)
SODIUM SERPL-SCNC: 138 MMOL/L (ref 135–145)
TRIGL SERPL-MCNC: 70 MG/DL

## 2024-12-18 PROCEDURE — 80053 COMPREHEN METABOLIC PANEL: CPT | Performed by: STUDENT IN AN ORGANIZED HEALTH CARE EDUCATION/TRAINING PROGRAM

## 2024-12-18 PROCEDURE — 99396 PREV VISIT EST AGE 40-64: CPT | Mod: 25 | Performed by: STUDENT IN AN ORGANIZED HEALTH CARE EDUCATION/TRAINING PROGRAM

## 2024-12-18 PROCEDURE — 90471 IMMUNIZATION ADMIN: CPT | Performed by: STUDENT IN AN ORGANIZED HEALTH CARE EDUCATION/TRAINING PROGRAM

## 2024-12-18 PROCEDURE — 90715 TDAP VACCINE 7 YRS/> IM: CPT | Performed by: STUDENT IN AN ORGANIZED HEALTH CARE EDUCATION/TRAINING PROGRAM

## 2024-12-18 PROCEDURE — 80061 LIPID PANEL: CPT | Performed by: STUDENT IN AN ORGANIZED HEALTH CARE EDUCATION/TRAINING PROGRAM

## 2024-12-18 PROCEDURE — 36415 COLL VENOUS BLD VENIPUNCTURE: CPT | Performed by: STUDENT IN AN ORGANIZED HEALTH CARE EDUCATION/TRAINING PROGRAM

## 2024-12-18 SDOH — HEALTH STABILITY: PHYSICAL HEALTH: ON AVERAGE, HOW MANY MINUTES DO YOU ENGAGE IN EXERCISE AT THIS LEVEL?: 20 MIN

## 2024-12-18 SDOH — HEALTH STABILITY: PHYSICAL HEALTH: ON AVERAGE, HOW MANY DAYS PER WEEK DO YOU ENGAGE IN MODERATE TO STRENUOUS EXERCISE (LIKE A BRISK WALK)?: 4 DAYS

## 2024-12-18 ASSESSMENT — PATIENT HEALTH QUESTIONNAIRE - PHQ9
10. IF YOU CHECKED OFF ANY PROBLEMS, HOW DIFFICULT HAVE THESE PROBLEMS MADE IT FOR YOU TO DO YOUR WORK, TAKE CARE OF THINGS AT HOME, OR GET ALONG WITH OTHER PEOPLE: NOT DIFFICULT AT ALL
SUM OF ALL RESPONSES TO PHQ QUESTIONS 1-9: 2
SUM OF ALL RESPONSES TO PHQ QUESTIONS 1-9: 2

## 2024-12-18 ASSESSMENT — SOCIAL DETERMINANTS OF HEALTH (SDOH): HOW OFTEN DO YOU GET TOGETHER WITH FRIENDS OR RELATIVES?: ONCE A WEEK

## 2024-12-18 ASSESSMENT — PAIN SCALES - GENERAL: PAINLEVEL_OUTOF10: MILD PAIN (3)

## 2024-12-18 NOTE — PATIENT INSTRUCTIONS
Patient Education   Preventive Care Advice   This is general advice given by our system to help you stay healthy. However, your care team may have specific advice just for you. Please talk to your care team about your preventive care needs.  Nutrition  Eat 5 or more servings of fruits and vegetables each day.  Try wheat bread, brown rice and whole grain pasta (instead of white bread, rice, and pasta).  Get enough calcium and vitamin D. Check the label on foods and aim for 100% of the RDA (recommended daily allowance).  Lifestyle  Exercise at least 150 minutes each week  (30 minutes a day, 5 days a week).  Do muscle strengthening activities 2 days a week. These help control your weight and prevent disease.  No smoking.  Wear sunscreen to prevent skin cancer.  Have a dental exam and cleaning every 6 months.  Yearly exams  See your health care team every year to talk about:  Any changes in your health.  Any medicines your care team has prescribed.  Preventive care, family planning, and ways to prevent chronic diseases.  Shots (vaccines)   HPV shots (up to age 26), if you've never had them before.  Hepatitis B shots (up to age 59), if you've never had them before.  COVID-19 shot: Get this shot when it's due.  Flu shot: Get a flu shot every year.  Tetanus shot: Get a tetanus shot every 10 years.  Pneumococcal, hepatitis A, and RSV shots: Ask your care team if you need these based on your risk.  Shingles shot (for age 50 and up)  General health tests  Diabetes screening:  Starting at age 35, Get screened for diabetes at least every 3 years.  If you are younger than age 35, ask your care team if you should be screened for diabetes.  Cholesterol test: At age 39, start having a cholesterol test every 5 years, or more often if advised.  Bone density scan (DEXA): At age 50, ask your care team if you should have this scan for osteoporosis (brittle bones).  Hepatitis C: Get tested at least once in your life.  STIs (sexually  transmitted infections)  Before age 24: Ask your care team if you should be screened for STIs.  After age 24: Get screened for STIs if you're at risk. You are at risk for STIs (including HIV) if:  You are sexually active with more than one person.  You don't use condoms every time.  You or a partner was diagnosed with a sexually transmitted infection.  If you are at risk for HIV, ask about PrEP medicine to prevent HIV.  Get tested for HIV at least once in your life, whether you are at risk for HIV or not.  Cancer screening tests  Cervical cancer screening: If you have a cervix, begin getting regular cervical cancer screening tests starting at age 21.  Breast cancer scan (mammogram): If you've ever had breasts, begin having regular mammograms starting at age 40. This is a scan to check for breast cancer.  Colon cancer screening: It is important to start screening for colon cancer at age 45.  Have a colonoscopy test every 10 years (or more often if you're at risk) Or, ask your provider about stool tests like a FIT test every year or Cologuard test every 3 years.  To learn more about your testing options, visit:   .  For help making a decision, visit:   https://bit.ly/gw85640.  Prostate cancer screening test: If you have a prostate, ask your care team if a prostate cancer screening test (PSA) at age 55 is right for you.  Lung cancer screening: If you are a current or former smoker ages 50 to 80, ask your care team if ongoing lung cancer screenings are right for you.  For informational purposes only. Not to replace the advice of your health care provider. Copyright   2023 [employer]. All rights reserved. Clinically reviewed by the [facility] Transitions Program. [facility] [id number] - REV 01/24.

## 2024-12-18 NOTE — NURSING NOTE
Prior to immunization administration, verified patients identity using patient s name and date of birth. Please see Immunization Activity for additional information.     Screening Questionnaire for Adult Immunization    Are you sick today?   No   Do you have allergies to medications, food, a vaccine component or latex?   No   Have you ever had a serious reaction after receiving a vaccination?   No   Do you have a long-term health problem with heart, lung, kidney, or metabolic disease (e.g., diabetes), asthma, a blood disorder, no spleen, complement component deficiency, a cochlear implant, or a spinal fluid leak?  Are you on long-term aspirin therapy?   No   Do you have cancer, leukemia, HIV/AIDS, or any other immune system problem?   No   Do you have a parent, brother, or sister with an immune system problem?   No   In the past 3 months, have you taken medications that affect  your immune system, such as prednisone, other steroids, or anticancer drugs; drugs for the treatment of rheumatoid arthritis, Crohn s disease, or psoriasis; or have you had radiation treatments?   No   Have you had a seizure, or a brain or other nervous system problem?   No   During the past year, have you received a transfusion of blood or blood    products, or been given immune (gamma) globulin or antiviral drug?   No   For women: Are you pregnant or is there a chance you could become       pregnant during the next month?   No   Have you received any vaccinations in the past 4 weeks?   No     Immunization questionnaire answers were all negative.      Patient instructed to remain in clinic for 15 minutes afterwards, and to report any adverse reactions.     Screening performed by Jaja Barron MA on 12/18/2024 at 8:44 AM.

## 2024-12-18 NOTE — PROGRESS NOTES
Preventive Care Visit  [facility]  Lenny Dumont MD, Family Medicine  Dec 18, 2024      Assessment & Plan   Problem List Items Addressed This Visit          Nervous and Auditory    Dystonia    Tinnitus, bilateral       Digestive    Constipation, unspecified constipation type       Endocrine    Mixed hyperlipidemia    Relevant Orders    Lipid panel reflex to direct LDL Fasting    Comprehensive metabolic panel (BMP + Alb, Alk Phos, ALT, AST, Total. Bili, TP)     Other Visit Diagnoses       Routine general medical examination at a health care facility    -  Primary    Nicotine dependence, uncomplicated, unspecified nicotine product type        Neck pain        Plantar fasciitis                Age-appropriate screening and immunization discussed.  Previous hyperlipidemia importance of diet and exercise changes were reviewed.  Also recommended complete smoking cessation.  Cardiovascular risk reviewed.  Recommend fiber and MiraLAX for his constipation and can use senna as needed.  Follow-up with audiology as he desires as well as neurology.  Home exercises for his neck and plantar fasciitis discussed.  Follow-up with physical therapy if not improving to consider orthotics.    Patient has been advised of split billing requirements and indicates understanding: Yes       Nicotine/Tobacco Cessation  He reports that he has been smoking cigarettes. He has a 23 pack-year smoking history. He has never used smokeless tobacco.  Nicotine/Tobacco Cessation Plan  Information offered: Patient not interested at this time      Counseling  Appropriate preventive services were addressed with this patient via screening, questionnaire, or discussion as appropriate for fall prevention, nutrition, physical activity, Tobacco-use cessation, social engagement, weight loss and cognition.  Checklist reviewing preventive services available has been given to the patient.  Reviewed patient's diet, addressing  concerns and/or questions.         Deepika Saavedra is a 44 year old, presenting for the following:  Physical        12/18/2024     8:03 AM   Additional Questions   Roomed by Genet HALE    Did see audiology recently and really much improved with hearing aids as well as his hearing loss.  He is planning to wait on getting those now but will do so in the future.   on history of dystonia in upper extremities with significant activity and did see neurology in the past.  He did not follow-up with them after our last visit and has been minimally bothersome. neck did flare recently and was working with chiropractor.  Things have been better now notes pain into his left foot for the last 2 weeks worse in the morning when he first wakes up.  Did get new boots..     Health Care Directive  Patient does not have a Health Care Directive: Discussed advance care planning with patient; information given to patient to review.      12/18/2024   General Health   How would you rate your overall physical health? Good   Feel stress (tense, anxious, or unable to sleep) Not at all            12/18/2024   Nutrition   Three or more servings of calcium each day? (!) NO   Diet: Regular (no restrictions)   How many servings of fruit and vegetables per day? (!) 0-1   How many sweetened beverages each day? 0-1            12/18/2024   Exercise   Days per week of moderate/strenous exercise 4 days   Average minutes spent exercising at this level 20 min            12/18/2024   Social Factors   Frequency of gathering with friends or relatives Once a week   Worry food won't last until get money to buy more No   Food not last or not have enough money for food? No   Do you have housing? (Housing is defined as stable permanent housing and does not include staying ouside in a car, in a tent, in an abandoned building, in an overnight shelter, or couch-surfing.) Yes   Are you worried about losing your housing? No   Lack of transportation? No  "  Unable to get utilities (heat,electricity)? No            12/18/2024   Dental   Dentist two times every year? Yes            12/18/2024   TB Screening   Were you born outside of the [place]? No          Today's PHQ-9 Score:       12/18/2024     5:43 AM   PHQ-9 SCORE   PHQ-9 Total Score MyChart 2 (Minimal depression)   PHQ-9 Total Score 2        Patient-reported         12/18/2024   Substance Use   Alcohol more than 3/day or more than 7/wk No   Do you use any other substances recreationally? No        Social History     Tobacco Use    Smoking status: Every Day     Current packs/day: 1.00     Average packs/day: 1 pack/day for 23.0 years (23.0 ttl pk-yrs)     Types: Cigarettes    Smokeless tobacco: Never   Vaping Use    Vaping status: Never Used   Substance Use Topics    Alcohol use: Yes    Drug use: No             12/18/2024   One time HIV Screening   Previous HIV test? No          12/18/2024   STI Screening   New sexual partner(s) since last STI/HIV test? No      ASCVD Risk   The ASCVD Risk score (Jimmy BARRAZA, et al., 2019) failed to calculate for the following reasons:    Cannot find a previous HDL lab    Cannot find a previous total cholesterol lab        12/18/2024   Contraception/Family Planning   Questions about contraception or family planning No           Reviewed and updated as needed this visit by Provider                    No past medical history on file.  Past Surgical History:   Procedure Laterality Date    TONSILLECTOMY  1993         Review of Systems  Constitutional, HEENT, cardiovascular, pulmonary, GI, , musculoskeletal, neuro, skin, endocrine and psych systems are negative, except as otherwise noted.     Objective    Exam  /66   Pulse 75   Temp 98.3  F (36.8  C) (Temporal)   Resp 18   Ht 1.835 m (6' 0.24\")   Wt 83.4 kg (183 lb 12.8 oz)   SpO2 99%   BMI 24.76 kg/m     Estimated body mass index is 24.76 kg/m  as calculated from the following:    Height as of this encounter: 1.835 m " "(6' 0.24\").    Weight as of this encounter: 83.4 kg (183 lb 12.8 oz).    Physical Exam  GENERAL: alert and no distress  EYES: Eyes grossly normal to inspection, PERRL and conjunctivae and sclerae normal  HENT: ear canals and TM's normal, nose and mouth without ulcers or lesions  NECK: no adenopathy, no asymmetry, masses, or scars  RESP: lungs clear to auscultation - no rales, rhonchi or wheezes  CV: regular rate and rhythm, normal S1 S2, no S3 or S4, no murmur, click or rub, no peripheral edema  ABDOMEN: soft, nontender, no hepatosplenomegaly, no masses and bowel sounds normal  MS: no gross musculoskeletal defects noted, no edema, plantar fascial tenderness in the left foot without bony MTP or malleoli or tenderness, no fifth metatarsal tenderness  SKIN: no suspicious lesions or rashes  NEURO: Normal strength and tone, brachial reflexes 2+ and symmetric, mentation intact and speech normal  PSYCH: mentation appears normal, affect normal/bright        Signed Electronically by: Lenny Dumont MD    Answers submitted by the patient for this visit:  Patient Health Questionnaire (Submitted on 12/18/2024)  If you checked off any problems, how difficult have these problems made it for you to do your work, take care of things at home, or get along with other people?: Not difficult at all  PHQ9 TOTAL SCORE: 2    "

## 2025-01-30 ENCOUNTER — HOSPITAL ENCOUNTER (OUTPATIENT)
Dept: CT IMAGING | Facility: CLINIC | Age: 45
Discharge: HOME | End: 2025-01-30
Attending: STUDENT IN AN ORGANIZED HEALTH CARE EDUCATION/TRAINING PROGRAM
Payer: COMMERCIAL

## 2025-01-30 DIAGNOSIS — Z91.89 AT RISK FOR CARDIOVASCULAR EVENT: ICD-10-CM

## 2025-01-30 PROCEDURE — 75571 CT HRT W/O DYE W/CA TEST: CPT
